# Patient Record
Sex: FEMALE | Race: WHITE
[De-identification: names, ages, dates, MRNs, and addresses within clinical notes are randomized per-mention and may not be internally consistent; named-entity substitution may affect disease eponyms.]

---

## 2023-05-21 ENCOUNTER — HOSPITAL ENCOUNTER (EMERGENCY)
Dept: HOSPITAL 75 - ER | Age: 33
Discharge: HOME | End: 2023-05-21
Payer: MEDICAID

## 2023-05-21 VITALS — HEIGHT: 62.99 IN | WEIGHT: 196.21 LBS | BODY MASS INDEX: 34.77 KG/M2

## 2023-05-21 VITALS — SYSTOLIC BLOOD PRESSURE: 120 MMHG | DIASTOLIC BLOOD PRESSURE: 72 MMHG

## 2023-05-21 DIAGNOSIS — Z79.899: ICD-10-CM

## 2023-05-21 DIAGNOSIS — E66.9: ICD-10-CM

## 2023-05-21 DIAGNOSIS — G50.0: Primary | ICD-10-CM

## 2023-05-21 DIAGNOSIS — Z28.310: ICD-10-CM

## 2023-05-21 DIAGNOSIS — F17.210: ICD-10-CM

## 2023-05-21 LAB
ALBUMIN SERPL-MCNC: 4.2 GM/DL (ref 3.2–4.5)
ALP SERPL-CCNC: 39 U/L (ref 40–136)
ALT SERPL-CCNC: 13 U/L (ref 0–55)
BASOPHILS # BLD AUTO: 0 10^3/UL (ref 0–0.1)
BASOPHILS NFR BLD AUTO: 0 % (ref 0–10)
BILIRUB SERPL-MCNC: 0.3 MG/DL (ref 0.1–1)
BUN/CREAT SERPL: 11
CALCIUM SERPL-MCNC: 9 MG/DL (ref 8.5–10.1)
CHLORIDE SERPL-SCNC: 106 MMOL/L (ref 98–107)
CO2 SERPL-SCNC: 19 MMOL/L (ref 21–32)
CREAT SERPL-MCNC: 0.85 MG/DL (ref 0.6–1.3)
EOSINOPHIL # BLD AUTO: 0.2 10^3/UL (ref 0–0.3)
EOSINOPHIL NFR BLD AUTO: 2 % (ref 0–10)
ERYTHROCYTE [SEDIMENTATION RATE] IN BLOOD: 6 MM/HR (ref 0–20)
GFR SERPLBLD BASED ON 1.73 SQ M-ARVRAT: 92 ML/MIN
GLUCOSE SERPL-MCNC: 149 MG/DL (ref 70–105)
HCT VFR BLD CALC: 38 % (ref 35–52)
HGB BLD-MCNC: 13.1 G/DL (ref 11.5–16)
LYMPHOCYTES # BLD AUTO: 4.2 10^3/UL (ref 1–4)
LYMPHOCYTES NFR BLD AUTO: 36 % (ref 12–44)
MANUAL DIFFERENTIAL PERFORMED BLD QL: NO
MCH RBC QN AUTO: 29 PG (ref 25–34)
MCHC RBC AUTO-ENTMCNC: 34 G/DL (ref 32–36)
MCV RBC AUTO: 86 FL (ref 80–99)
MONOCYTES # BLD AUTO: 0.9 10^3/UL (ref 0–1)
MONOCYTES NFR BLD AUTO: 8 % (ref 0–12)
NEUTROPHILS # BLD AUTO: 6.3 10^3/UL (ref 1.8–7.8)
NEUTROPHILS NFR BLD AUTO: 54 % (ref 42–75)
PLATELET # BLD: 273 10^3/UL (ref 130–400)
PMV BLD AUTO: 10.5 FL (ref 9–12.2)
POTASSIUM SERPL-SCNC: 3.9 MMOL/L (ref 3.6–5)
PROT SERPL-MCNC: 7.1 GM/DL (ref 6.4–8.2)
SODIUM SERPL-SCNC: 138 MMOL/L (ref 135–145)
WBC # BLD AUTO: 11.7 10^3/UL (ref 4.3–11)

## 2023-05-21 PROCEDURE — 85025 COMPLETE CBC W/AUTO DIFF WBC: CPT

## 2023-05-21 PROCEDURE — 80053 COMPREHEN METABOLIC PANEL: CPT

## 2023-05-21 PROCEDURE — 84703 CHORIONIC GONADOTROPIN ASSAY: CPT

## 2023-05-21 PROCEDURE — 99283 EMERGENCY DEPT VISIT LOW MDM: CPT

## 2023-05-21 PROCEDURE — 86141 C-REACTIVE PROTEIN HS: CPT

## 2023-05-21 PROCEDURE — 85652 RBC SED RATE AUTOMATED: CPT

## 2023-05-21 PROCEDURE — 36415 COLL VENOUS BLD VENIPUNCTURE: CPT

## 2023-05-21 NOTE — ED GENERAL
General


Chief Complaint:  Head/Cervical Problems


Stated Complaint:  HEADACHE


Nursing Triage Note:  


PT STATES LT SIDE HEADACHE


Source of Information:  Patient





History of Present Illness


Date Seen by Provider:  May 21, 2023


Time Seen by Provider:  17:55


Initial Comments


PT ARRIVES VIA POV FROM HOME WITH MALE S.O.


C/O LEFT TRIGEMINAL NEURALGIA PAIN


STATES SHE WAS DX 8-10 YEARS AGO IN [place], [place]


SHE HAD BEEN ON GABAPENTIN IN THE PAST, ALONG WITH STEROIDS. SHE STATES SHE DID 

NOT LIKE THE WAY THE GABEPENTIN MADE HER FEEL. 


SHE STATES SHE HAS NOT HAD ANY PROBLEMS FOR ABOUT 6 YEARS


A FEW WEEKS AGO, SHE HAD A DENTAL PROCEDURE DONE, AND AFTER THAT SHE HAS HAD 

RETURN OF SYMPTOMS


SHE TOOK 7 DAYS OF PREDNISONE 50 MG--PRESCRIBED 23 BY DR. LAUREN. 


SHE STATES IT WAS DOING FINE UNTIL TODAY--JUST STARTED "OUT OF THE BLUE" WHILE 

SHE WAS WATCHING A MOVIE, JUST PRIOR TO ARRIVAL. 


C/O SEVERE PAIN TO LEFT TMJ AREA / LEFT SIDE OF FACE AND AROUND LEFT EAR


NO PARESTHESIAS OR MOTOR DEFICITS


NO CHANGES IN VISION OR HEARING


NO RASH TO AREA





NO FEVER OR RECENT ILLNESS





HAS NOT TAKEN ANYTHING FOR PAIN AT ANY TIME. 





LMP--UNKNOWN, SOMETIME IN THE LAST MONTH, S/P BTL. 





STATES SHE HAS "FIBRO" BUT DOES NOT TAKE ANY MEDICATIONS FOR IT


STATES SHE DOES NOT TAKE ANY MEDICATION OF ANY KIND FOR ANYTHING ( PER MED 

RECONCILIATION, SHE WAS ALSO PRESCRIBED PHENTERMINE ON 23 BY DR. LAUREN )





PCP: DR. LAUREN IN [place]





Allergies and Home Medications


Allergies


Coded Allergies:  


     No Known Drug Allergies (Unverified , 23)





Patient Home Medication List


Home Medication List Reviewed:  Yes


Prednisone (Prednisone) 5 Mg Tablet, 5 MG PO UD


   Prescribed by: DRU HAMPTON on 23


Pregabalin (Lyrica) 75 Mg Capsule, 75 MG PO BID


   Prescribed by: DRU HAMPTON on 23





Review of Systems


Review of Systems


Constitutional:  no symptoms reported


EENTM:  see HPI


Respiratory:  no symptoms reported


Cardiovascular:  no symptoms reported


Gastrointestinal:  no symptoms reported


Genitourinary:  no symptoms reported


Pregnant:  No


Musculoskeletal:  see HPI


Skin:  no symptoms reported


Psychiatric/Neurological:  See HPI


Hematologic/Lymphatic:  No Symptoms Reported


Immunological/Allergic:  no symptoms reported





Past Medical-Social-Family Hx


Patient Social History


Tobacco Use?:  Yes


Tobacco type used:  Cigarettes


Smoking Status:  Current Everyday Smoker


Substance use?:  No


Alcohol Use?:  No





Past Medical History


Surgery/Hospitalization HX:  


FIBROMYALGIA, DOYLE, 3 C SECTIONS, TUBAL LIGATION


Surgeries:  Yes (2 SURGERIES ON OVARIES;  X 3)


 Section, Gallbladder, Tubal Ligation


Respiratory:  No


Cardiac:  No


Neurological:  Yes (TRIGEMINAL NEURALGIA)


Pregnant:  No


GYN History:  Tubal Ligation


Genitourinary:  No


Gastrointestinal:  No


Musculoskeletal:  Yes


Fibromyalgia


Endocrine:  No (OBESITY)


HEENT:  No


Cancer:  No


Psychosocial:  No


Integumentary:  No


Blood Disorders:  No





Family Medical History








SOCIAL HISTORY:


-SMOKES 1 PPD


-DENIES ETOH USE


-DENIES DRUG USE





PAST SURGICAL HISTORY:


-BILATERAL TUBAL LIGATION


-C-SECTIONS X 3


-CHOLECYSTECTOMY


-" 2 SURGERIES TO CLEAN OUT MY OVARIES"





Physical Exam


Vital Signs





Vital Signs - First Documented








 23





 17:53 18:50


 


Temp 36.0 


 


Pulse 127 


 


Resp 24 


 


B/P (MAP) 142/117 (125) 


 


Pulse Ox  96


 


O2 Delivery Room Air 





Capillary Refill :


Height, Weight, BMI


Height: '"


Weight: lbs. oz. kg; 34.00 BMI


Method:


General Appearance:  WD/WN, Obese, Other (CRYING, HOSTILE, VERY DRAMATIC)


HEENT:  PERRL/EOMI, TMs Normal, Normal ENT Inspection, Pharynx Normal, Moist 

Mucous Membranes, Other (TENDERNESS TO LEFT TMJ AREA, LEFT SIDE OF FACE, AND 

LEFT PERIAURICULAR AREA, WITH SOME MILD  TRISMUS. NO RASH. NO FACIAL DROOP. NO 

DECREASED SENSATION)


Neck:  Full Range of Motion, Normal Inspection, Non Tender, Supple


Respiratory:  Normal Breath Sounds, No Accessory Muscle Use, No Respiratory 

Distress


Cardiovascular:  Regular Rate, Rhythm, No Murmur


Extremity:  Normal Inspection


Neurologic/Psychiatric:  Alert, Oriented x3, No Motor/Sensory Deficits, CNs II-

XII Norm as Tested


Skin:  Normal Color, Warm/Dry, Tattoos/Piercings (EXTENSIVE TATTOOS)





Progress/Results/Core Measures


Suspected Sepsis


SIRS


Temperature: 


Pulse: 127 


Respiratory Rate: 24


 


Laboratory Tests


23 17:55: White Blood Count 11.7H


Blood Pressure 142 /117 


Mean: 125


 





Laboratory Tests


23 17:55: 


Creatinine 0.85, Platelet Count 273, Total Bilirubin 0.3








Results/Orders


Lab Results





Laboratory Tests








Test


 23


17:55 Range/Units


 


 


White Blood Count


 11.7 H


 4.3-11.0


10^3/uL


 


Red Blood Count


 4.45 


 3.80-5.11


10^6/uL


 


Hemoglobin 13.1  11.5-16.0  g/dL


 


Hematocrit 38  35-52  %


 


Mean Corpuscular Volume 86  80-99  fL


 


Mean Corpuscular Hemoglobin 29  25-34  pg


 


Mean Corpuscular Hemoglobin


Concent 34 


 32-36  g/dL





 


Red Cell Distribution Width 12.8  10.0-14.5  %


 


Platelet Count


 273 


 130-400


10^3/uL


 


Mean Platelet Volume 10.5  9.0-12.2  fL


 


Immature Granulocyte % (Auto) 0   %


 


Neutrophils (%) (Auto) 54  42-75  %


 


Lymphocytes (%) (Auto) 36  12-44  %


 


Monocytes (%) (Auto) 8  0-12  %


 


Eosinophils (%) (Auto) 2  0-10  %


 


Basophils (%) (Auto) 0  0-10  %


 


Neutrophils # (Auto)


 6.3 


 1.8-7.8


10^3/uL


 


Lymphocytes # (Auto)


 4.2 H


 1.0-4.0


10^3/uL


 


Monocytes # (Auto)


 0.9 


 0.0-1.0


10^3/uL


 


Eosinophils # (Auto)


 0.2 


 0.0-0.3


10^3/uL


 


Basophils # (Auto)


 0.0 


 0.0-0.1


10^3/uL


 


Immature Granulocyte # (Auto)


 0.0 


 0.0-0.1


10^3/uL


 


Erythrocyte Sedimentation Rate 6  0-20  MM/HR


 


Sodium Level 138  135-145  MMOL/L


 


Potassium Level 3.9  3.6-5.0  MMOL/L


 


Chloride Level 106    MMOL/L


 


Carbon Dioxide Level 19 L 21-32  MMOL/L


 


Anion Gap 13  5-14  MMOL/L


 


Blood Urea Nitrogen 9  7-18  MG/DL


 


Creatinine


 0.85 


 0.60-1.30


MG/DL


 


Estimat Glomerular Filtration


Rate 92 


  





 


BUN/Creatinine Ratio 11   


 


Glucose Level 149 H   MG/DL


 


Calcium Level 9.0  8.5-10.1  MG/DL


 


Corrected Calcium 8.8  8.5-10.1  MG/DL


 


Total Bilirubin 0.3  0.1-1.0  MG/DL


 


Aspartate Amino Transf


(AST/SGOT) 14 


 5-34  U/L





 


Alanine Aminotransferase


(ALT/SGPT) 13 


 0-55  U/L





 


Alkaline Phosphatase 39 L   U/L


 


C-Reactive Protein High


Sensitivity 0.06 


 0.00-0.50


MG/DL


 


Total Protein 7.1  6.4-8.2  GM/DL


 


Albumin 4.2  3.2-4.5  GM/DL


 


Serum Pregnancy Test,


Qualitative NEGATIVE 


 NEGATIVE  











My Orders





Orders - DRU HAMPTON DO


Ketorolac Injection (Toradol Injection) (23 18:15)


Pregabalin Capsule (Lyrica Capsule) (23 18:15)


Cbc With Automated Diff (23 18:03)


Comprehensive Metabolic Panel (23 18:03)


Hs C Reactive Protein (23 18:03)


Hcg,Qualitative Serum (23 18:03)


Erythrocyte Sedimentation Rate (23 18:03)


Dexamethasone Injection (Decadron Inje (23 18:15)





Medications Given in ED





Current Medications








 Medications  Dose


 Ordered  Sig/Randi


 Route  Start Time


 Stop Time Status Last Admin


Dose Admin


 


 Dexamethasone


 Sodium Phosphate  10 mg  ONCE  ONCE


 IV  23 18:15


 23 18:16 DC 23 18:37


10 MG


 


 Ketorolac


 Tromethamine  30 mg  ONCE  ONCE


 IVP  23 18:15


 23 18:16 DC 23 18:37


30 MG


 


 Pregabalin  75 mg  ONCE  ONCE


 PO  23 18:15


 23 18:16 DC 23 18:38


75 MG








Vital Signs/I&O











 23





 17:53 18:50


 


Temp 36.0 


 


Pulse 127 72


 


Resp 24 18


 


B/P (MAP) 142/117 (125) 120/72


 


Pulse Ox  96


 


O2 Delivery Room Air Room Air





Capillary Refill :








Blood Pressure Mean:                    125








Progress Note :  


Progress Note





GIVEN:


-TORADOL


-DECADRON


-LYRICA





NO DETERIORATION IN PT'S CONDITION





DISCUSSED ANTICIPATED COURSE, SYMPTOMATIC TREATMENT, MEDICATIONS, NEED FOR 

FOLLOW UP AND RETURN PRECAUTIONS





NO PRIOR VISITS HERE.





Departure


Impression





   Primary Impression:  


   Left-sided trigeminal neuralgia


Disposition:  01 HOME, SELF-CARE


Condition:  Stable





Departure-Patient Inst.


Decision time for Depature:  18:30


Referrals:  


RIVER LAUREN MD


Patient Instructions:  Trigeminal neuralgia





Add. Discharge Instructions:  


MOIST HEAT AND/OR COOL COMPRESSES TO THE AREA AT 20 MINUTE INTERVALS





SOFT FOODS--AVOID FOODS THAT REQUIRE CHEWING, AVOID CHEWING GUM, ETC. 





FOLLOW UP WITH DR. LAUREN NEXT WEEK FOR FURTHER CARE--CALL IN THE MORNING TO 

SCHEDULE AN APPOINTMENT





All discharge instructions reviewed with patient and/or family. Voiced 

understanding.


Scripts


Pregabalin (Lyrica) 75 Mg Capsule


75 MG PO BID, #14 CAP


   Prov: DRU HAMPTON DO         23 


Prednisone (Prednisone) 5 Mg Tablet


5 MG PO UD, #78 TAB


   12 PILLS DAY 1, THEN DECREASE BY 1 PILL A DAY UNTIL GONE


   Prov: DRU HAMPTON DO         23











DRU HAMPTON DO                 May 21, 2023 18:07